# Patient Record
Sex: MALE | Employment: FULL TIME | URBAN - METROPOLITAN AREA
[De-identification: names, ages, dates, MRNs, and addresses within clinical notes are randomized per-mention and may not be internally consistent; named-entity substitution may affect disease eponyms.]

---

## 2020-03-04 ENCOUNTER — THERAPY VISIT (OUTPATIENT)
Dept: PHYSICAL THERAPY | Facility: CLINIC | Age: 65
End: 2020-03-04
Payer: COMMERCIAL

## 2020-03-04 DIAGNOSIS — M54.12 CERVICAL RADICULOPATHY: ICD-10-CM

## 2020-03-04 DIAGNOSIS — M25.512 ACUTE PAIN OF LEFT SHOULDER: ICD-10-CM

## 2020-03-04 PROCEDURE — 97110 THERAPEUTIC EXERCISES: CPT | Mod: GP | Performed by: PHYSICAL THERAPIST

## 2020-03-04 PROCEDURE — 97161 PT EVAL LOW COMPLEX 20 MIN: CPT | Mod: GP | Performed by: PHYSICAL THERAPIST

## 2020-03-04 PROCEDURE — 97112 NEUROMUSCULAR REEDUCATION: CPT | Mod: GP | Performed by: PHYSICAL THERAPIST

## 2020-03-04 NOTE — PROGRESS NOTES
[facility] Initial Evaluation  Subjective:  The history is provided by the patient. No  was used.   Therapist Generated HPI Evaluation  Problem details: I have had neck pain and ache at the end of the day.  On or about 2/1/2020 I developed increase in neck and left arm pain.  The pain is in the left upper arm.  I also have left shoulder soreness when moving the shoulder .         Type of problem:  Cervical spine (left shoulder).    This is a chronic condition.  Condition occurred with:  Insidious onset.  Where condition occurred: for unknown reasons.  Patient reports pain:  Lower cervical spine, central cervical spine and upper thoracic.  Pain is described as sharp (shoulder pain with moving left shoulder) and is constant.  Pain radiates to:  Shoulder left and upper arm left. Pain is worse in the P.M. and worse in the A.M..  Since onset symptoms are unchanged.  Associated symptoms:  Loss of strength and ringing in ears (left arm weakness,nerve pain ). Symptoms are exacerbated by carrying, lifting, looking up or down and sitting (moving arm )  and relieved by NSAID's.  Special tests included:  MRI (hernitated cervical disc).  Past treatment: none.   Restrictions due to condition include:  Working in normal job without restrictions ([employer] ).  Home/work barriers: house, multilevel.  wife .      Patient Health History  Ashvin Zainab being seen for neck nd left arm pain .     Problem began: 2/26/2020 (MD appointment).   Problem occurred: unknown   Pain is reported as 7/10 on pain scale.  General health as reported by patient is good.  Pertinent medical history includes: smoking (smoke 1 pack a day).   Red flags:  None as reported by patient.     Surgeries include:  None.    Current medications:  Anti-inflammatory. Other medications details: melantonin.    Current occupation is .  .   Primary job tasks include:  Computer work, lifting/carrying, operating a  machine/assembly, prolonged standing, prolonged sitting and repetitive tasks.                                    Objective:  Standing Alignment:    Cervical/Thoracic:  Forward head, thoracic kyphosis increased and cervical lordosis increased (poor sitting posture)  Shoulder/UE:  Rounded shoulders, scapular abduction L and scapular abduction R (internal rotation of bilateral shoulders)  Lumbar:  Sway back                Flexibility/Screens:   Positive screens:  Cervical; Thoracic and Shoulder  Upper Extremity:    Decreased left upper extremity flexibility at:  Pectoralis Major; Pectoralis Minor and Latissimus    Decreased right upper extremity flexibility present at:  Pectoralis Major; Pectoralis Minor and Latissimus    Spine:  Decreased left spine flexibility:  Scalenes; Upper Trap and Levator    Decreased right spine flexibility:  Scalenes; Upper Trap and Levator                  Cervical/Thoracic Evaluation    AROM:  AROM Cervical:    Flexion:          WFL  Extension:       Minimal movement decrease in pain   Rotation:         Left: moderate movement     Right: WFL  Side Bend:      Left:     Right:   AROM Thoracic:    Flexion:             Extension:          0  Rotation:            Left:     Right:        Cervical Myotomes:    C1-2 (Neck Flex): Left:  5    Right: 5  C3 (neck side bend): Left: 5    Right: 5  C4 (shrug):  Left: 5      C5 (Deltoid):  Left: 5    Right: 5  C6 (Biceps):  Left: 5    Right: 5  C7 (Triceps):  Left: 4+    Right: 5  C8 (Thumb Ext): Left: 5    Right: 5  T1 (Intrinsics): Left: 5-    Right: 5        Cervical Palpation:  : upper throracic.  Tenderness present at Left:    Scalenes; Upper Trap; Levator and Facet  Tenderness present at Right:    Scalenes; Upper Trap; Levator and Facet      Spinal Segmental Conclusions:    Level:  Hypo at T2, T3, T4, T5, T6, T7, T8, T9 and T10             Shoulder Evaluation:  ROM:  AROM:    Flexion:  Left:  75% of motion    Right:  WFL    Abduction:  Left: 75% of  motion   Right:  WFL                Flexion/External Rotation:  Left:  Neck     Right:  Neck   Extension/Internal Rotation:  Left:  Lumbar    Right:  Lumbar      Pain: poor scapular position and control with arm movement    Strength:    Flexion: Left:5-/5   Pain:    Right: 5/5     Pain:     Abduction:  Left: 5-/5  Pain:    Right: 5/5     Pain:  Adduction:  Left: 5/5    Pain:    Right: 5/5     Pain:  Internal Rotation:  Left:4/5     Pain:    Right: 5/5     Pain:  External Rotation:   Left:3+/5     Pain:   Right:5/5     Pain:        Elbow Flexion:  Left:5/5     Pain:    Right:5/5     Pain:  Elbow Extension:  Left:4+/5     Pain:    Right:5/5     Pain:      Palpation:    Left shoulder tenderness present at:  Supraspinatus; Levator; Upper Trap and Bicipital Groove    Mobility Tests:      Glenohumeral posterior left:  Hypomobile                                               General     ROS    Assessment/Plan:    Patient is a 64 year old male with cervical and left side shoulder complaints.    Patient has the following significant findings with corresponding treatment plan.                Diagnosis 1:  Cervical radiculopathy with left shoulder pain  Pain -  manual therapy, self management, education, directional preference exercise and home program  Decreased ROM/flexibility - manual therapy, therapeutic exercise, therapeutic activity and home program  Decreased joint mobility - manual therapy, therapeutic exercise, therapeutic activity and home program  Decreased strength - therapeutic exercise, therapeutic activities and home program  Impaired muscle performance - neuro re-education and home program  Decreased function - therapeutic activities and home program  Impaired posture - neuro re-education, therapeutic activities and home program  Evaluation ongoing.    Therapy Evaluation Codes:   Cumulative Therapy Evaluation is: Low complexity.    Previous and current functional limitations:  (See Goal Flow Sheet for this  information)    Short term and Long term goals: (See Goal Flow Sheet for this information)     Communication ability:  Patient appears to be able to clearly communicate and understand verbal and written communication and follow directions correctly.  Treatment Explanation - The following has been discussed with the patient:   RX ordered/plan of care  This patient would benefit from PT intervention to resume normal activities.   Rehab potential is good.    Frequency:  1 X week, once daily  Duration:  for 8 weeks  Discharge Plan:  Achieve all LTG.  Independent in home treatment program.    Please refer to the daily flowsheet for treatment today, total treatment time and time spent performing 1:1 timed codes.

## 2020-03-04 NOTE — LETTER
[facility] ATHLETIC [facility] PHYSICAL THERAPY  [address]  [facility] [address]  [phone]    2020    Re: Ashvin Lamb   :   1955  MRN:  [id number]   REFERRING PHYSICIAN:   Irene Mares    [facility]    Date of Initial Evaluation:  2020  Visits:  Rxs Used: 1  Reason for Referral:     Cervical radiculopathy  Acute pain of left shoulder    EVALUATION SUMMARY  [facility]  Subjective:  The history is provided by the patient. No  was used.   Therapist Generated HPI Evaluation  Problem details: I have had neck pain and ache at the end of the day.  On or about 2020 I developed increase in neck and left arm pain.  The pain is in the left upper arm.  I also have left shoulder soreness when moving the shoulder .         Type of problem:  Cervical spine (left shoulder).  This is a chronic condition.  Condition occurred with:  Insidious onset.  Where condition occurred: for unknown reasons.  Patient reports pain:  Lower cervical spine, central cervical spine and upper thoracic.  Pain is described as sharp (shoulder pain with moving left shoulder) and is constant.  Pain radiates to:  Shoulder left and upper arm left. Pain is worse in the P.M. and worse in the A.M..  Since onset symptoms are unchanged.  Associated symptoms:  Loss of strength and ringing in ears (left arm weakness,nerve pain ). Symptoms are exacerbated by carrying, lifting, looking up or down and sitting (moving arm )  and relieved by NSAID's.  Special tests included:  MRI (hernitated cervical disc).  Past treatment: none.   Restrictions due to condition include:  Working in normal job without restrictions ([employer] ).  Home/work barriers: house, multilevel.  wife .      Patient Health History  Ashvin Lamb being seen for neck nd left arm pain .   Problem began:  2020 (MD appointment).   Problem occurred: unknown   Pain is reported as 7/10 on pain scale.  General health as reported by patient is good.  Pertinent medical history includes: smoking (smoke 1 pack a day).   Red flags:  None as reported by patient.  Ashvin Lamb   :   1955    Surgeries include:  None.    Current medications:  Anti-inflammatory. Other medications details: melantonin.    Current occupation is .  .   Primary job tasks include:  Computer work, lifting/carrying, operating a machine/assembly, prolonged standing, prolonged sitting and repetitive tasks.     Objective:  Standing Alignment:    Cervical/Thoracic:  Forward head, thoracic kyphosis increased and cervical lordosis increased (poor sitting posture)  Shoulder/UE:  Rounded shoulders, scapular abduction L and scapular abduction R (internal rotation of bilateral shoulders)  Lumbar:  Sway back  Flexibility/Screens:   Positive screens:  Cervical; Thoracic and Shoulder  Upper Extremity:    Decreased left upper extremity flexibility at:  Pectoralis Major; Pectoralis Minor and Latissimus  Decreased right upper extremity flexibility present at:  Pectoralis Major; Pectoralis Minor and Latissimus  Spine:  Decreased left spine flexibility:  Scalenes; Upper Trap and Levator  Decreased right spine flexibility:  Scalenes; Upper Trap and Levator    Cervical/Thoracic Evaluation    AROM:  AROM Cervical:  Flexion:          WFL  Extension:       Minimal movement decrease in pain   Rotation:         Left: moderate movement     Right: WFL  Side Bend:      Left:     Right:   AROM Thoracic:  Flexion:             Extension:          0  Rotation:            Left:     Right:      Cervical Myotomes:    C1-2 (Neck Flex): Left:  5    Right: 5  C3 (neck side bend): Left: 5    Right: 5  C4 (shrug):  Left: 5      C5 (Deltoid):  Left: 5    Right: 5  C6 (Biceps):  Left: 5    Right: 5  C7 (Triceps):  Left: 4+    Right: 5  C8 (Thumb Ext): Left: 5    Right: 5  T1  (Intrinsics): Left: 5-    Right: 5    Cervical Palpation:  : upper throracic.  Tenderness present at Left:    Scalenes; Upper Trap; Levator and Facet  Tenderness present at Right:    Scalenes; Upper Trap; Levator and Facet    Ashvin Lamb   :   1955    Spinal Segmental Conclusions:    Level:  Hypo at T2, T3, T4, T5, T6, T7, T8, T9 and T10    Shoulder Evaluation:  ROM:  AROM:    Flexion:  Left:  75% of motion    Right:  WFL  Abduction:  Left: 75% of motion   Right:  WFL  Flexion/External Rotation:  Left:  Neck     Right:  Neck   Extension/Internal Rotation:  Left:  Lumbar    Right:  Lumbar    Pain: poor scapular position and control with arm movement    Strength:    Flexion: Left:5-/5   Pain:    Right: 5/5     Pain:   Abduction:  Left: 5-/5  Pain:    Right: 5/5     Pain:  Adduction:  Left: 5/5    Pain:    Right: 5/5     Pain:  Internal Rotation:  Left:4/5     Pain:    Right: 5/5     Pain:  External Rotation:   Left:3+/5     Pain:   Right:5/5     Pain:    Elbow Flexion:  Left:5/5     Pain:    Right:5/5     Pain:  Elbow Extension:  Left:4+/5     Pain:    Right:5/5     Pain:    Palpation:    Left shoulder tenderness present at:  Supraspinatus; Levator; Upper Trap and Bicipital Groove    Mobility Tests:    Glenohumeral posterior left:  Hypomobile    General   ROS  Assessment/Plan:    Patient is a 64 year old male with cervical and left side shoulder complaints.    Patient has the following significant findings with corresponding treatment plan.                Diagnosis 1:  Cervical radiculopathy with left shoulder pain  Pain -  manual therapy, self management, education, directional preference exercise and home program  Decreased ROM/flexibility - manual therapy, therapeutic exercise, therapeutic activity and home program  Decreased joint mobility - manual therapy, therapeutic exercise, therapeutic activity and home program  Decreased strength - therapeutic exercise, therapeutic activities and home  program  Impaired muscle performance - neuro re-education and home program  Decreased function - therapeutic activities and home program  Impaired posture - neuro re-education, therapeutic activities and home program  Evaluation ongoing.    Therapy Evaluation Codes:   Cumulative Therapy Evaluation is: Low complexity.  Previous and current functional limitations:  (See Goal Flow Sheet for this information)    Short term and Long term goals: (See Goal Flow Sheet for this information)         Ashvin Lamb   :   1955    Communication ability:  Patient appears to be able to clearly communicate and understand verbal and written communication and follow directions correctly.  Treatment Explanation - The following has been discussed with the patient:   RX ordered/plan of care  This patient would benefit from PT intervention to resume normal activities.   Rehab potential is good.    Frequency:  1 X week, once daily  Duration:  for 8 weeks  Discharge Plan:  Achieve all LTG.  Independent in home treatment program.    Thank you for your referral.    INQUIRIES  Therapist: Shanell Tapia, PT   [facility]  [address] [address]  Phone: [phone]  Fax: [phone]

## 2020-03-05 PROBLEM — M54.12 CERVICAL RADICULOPATHY: Status: ACTIVE | Noted: 2020-03-05

## 2020-03-05 PROBLEM — M25.512 ACUTE PAIN OF LEFT SHOULDER: Status: ACTIVE | Noted: 2020-03-05

## 2020-03-11 ENCOUNTER — THERAPY VISIT (OUTPATIENT)
Dept: PHYSICAL THERAPY | Facility: CLINIC | Age: 65
End: 2020-03-11
Payer: COMMERCIAL

## 2020-03-11 DIAGNOSIS — M25.512 ACUTE PAIN OF LEFT SHOULDER: ICD-10-CM

## 2020-03-11 DIAGNOSIS — M54.12 CERVICAL RADICULOPATHY: ICD-10-CM

## 2020-03-11 PROCEDURE — 97112 NEUROMUSCULAR REEDUCATION: CPT | Mod: GP | Performed by: PHYSICAL THERAPIST

## 2020-03-11 PROCEDURE — 97110 THERAPEUTIC EXERCISES: CPT | Mod: GP | Performed by: PHYSICAL THERAPIST

## 2020-03-11 PROCEDURE — 97140 MANUAL THERAPY 1/> REGIONS: CPT | Mod: GP | Performed by: PHYSICAL THERAPIST

## 2020-12-14 PROBLEM — M25.512 ACUTE PAIN OF LEFT SHOULDER: Status: RESOLVED | Noted: 2020-03-05 | Resolved: 2020-12-14

## 2020-12-14 PROBLEM — M54.12 CERVICAL RADICULOPATHY: Status: RESOLVED | Noted: 2020-03-05 | Resolved: 2020-12-14

## 2020-12-14 NOTE — PROGRESS NOTES
Patient seen for one time evaluation and and one further treatment.  Patient did not return for further treatment and current status is unknown.  Please see initial evaluation for further information.